# Patient Record
Sex: MALE
[De-identification: names, ages, dates, MRNs, and addresses within clinical notes are randomized per-mention and may not be internally consistent; named-entity substitution may affect disease eponyms.]

---

## 2022-09-09 PROBLEM — Z00.00 ENCOUNTER FOR PREVENTIVE HEALTH EXAMINATION: Status: ACTIVE | Noted: 2022-09-09

## 2022-10-17 ENCOUNTER — APPOINTMENT (OUTPATIENT)
Dept: CARDIOLOGY | Facility: CLINIC | Age: 42
End: 2022-10-17

## 2022-10-17 ENCOUNTER — NON-APPOINTMENT (OUTPATIENT)
Age: 42
End: 2022-10-17

## 2022-10-17 VITALS
HEART RATE: 82 BPM | SYSTOLIC BLOOD PRESSURE: 154 MMHG | OXYGEN SATURATION: 100 % | DIASTOLIC BLOOD PRESSURE: 92 MMHG | WEIGHT: 170.25 LBS | BODY MASS INDEX: 27.36 KG/M2 | HEIGHT: 66 IN

## 2022-10-17 DIAGNOSIS — R03.0 ELEVATED BLOOD-PRESSURE READING, W/OUT DIAGNOSIS OF HYPERTENSION: ICD-10-CM

## 2022-10-17 DIAGNOSIS — Z78.9 OTHER SPECIFIED HEALTH STATUS: ICD-10-CM

## 2022-10-17 DIAGNOSIS — Z82.49 FAMILY HISTORY OF ISCHEMIC HEART DISEASE AND OTHER DISEASES OF THE CIRCULATORY SYSTEM: ICD-10-CM

## 2022-10-17 DIAGNOSIS — R06.02 SHORTNESS OF BREATH: ICD-10-CM

## 2022-10-17 PROCEDURE — 99204 OFFICE O/P NEW MOD 45 MIN: CPT | Mod: 25

## 2022-10-17 PROCEDURE — 93000 ELECTROCARDIOGRAM COMPLETE: CPT

## 2022-10-17 NOTE — HISTORY OF PRESENT ILLNESS
[FreeTextEntry1] : 42 year old male without significant past medical history came for cardiac evaluation with complain that he had episode of shortness of breath , burping 1 month ago , continued for 4 days , as per patient who is under stressed , was drinking coffee , started feeling some discomfort in upper abdomen , with mild shortness of breath at rest , resolves with burping , not related to exertion , \par \par but do get same feeling while walking also , patient stopped drinking coffee since then he is feeling fine , \par \par

## 2022-10-17 NOTE — DISCUSSION/SUMMARY
[FreeTextEntry1] : Patient with above hx \par \par shortness of breath feeling with dyspepsia  possibly related to GERD  and anxiety component , normal blood pressure , normal EKG , no exertional symptoms , doubt cardiac origin  advised the patient to decrease caffeine intake , take omeprazole  and observe , will obtain echocardiogram , exercise stress test , \par will obtain his prior blood work to assess lipid status ,

## 2022-11-09 ENCOUNTER — APPOINTMENT (OUTPATIENT)
Dept: CARDIOLOGY | Facility: CLINIC | Age: 42
End: 2022-11-09

## 2022-11-09 PROCEDURE — 93306 TTE W/DOPPLER COMPLETE: CPT

## 2022-11-09 PROCEDURE — 93015 CV STRESS TEST SUPVJ I&R: CPT

## 2022-12-05 ENCOUNTER — APPOINTMENT (OUTPATIENT)
Dept: CARDIOLOGY | Facility: CLINIC | Age: 42
End: 2022-12-05

## 2022-12-05 VITALS
HEIGHT: 66 IN | WEIGHT: 173 LBS | DIASTOLIC BLOOD PRESSURE: 90 MMHG | BODY MASS INDEX: 27.8 KG/M2 | OXYGEN SATURATION: 100 % | SYSTOLIC BLOOD PRESSURE: 150 MMHG | HEART RATE: 70 BPM

## 2022-12-05 PROCEDURE — 99214 OFFICE O/P EST MOD 30 MIN: CPT

## 2022-12-05 RX ORDER — AMLODIPINE BESYLATE 2.5 MG/1
2.5 TABLET ORAL DAILY
Qty: 90 | Refills: 1 | Status: ACTIVE | COMMUNITY
Start: 2022-12-05 | End: 1900-01-01

## 2022-12-05 NOTE — CARDIOLOGY SUMMARY
[de-identified] : 10/17/22 normal sinus rhythm  [de-identified] : 11/9/22  7 METS  6 minutes 31 seconds  negative for ischemia  [de-identified] :  11/9/22  Mild LVH Normal EF 60-65%

## 2022-12-05 NOTE — HISTORY OF PRESENT ILLNESS
[FreeTextEntry1] : 42 year old male without significant past medical history came for follow up  after testings   , patient had echo showed mild LVH with normal EF , normal exercise stress test  normal pressure response ,   no recurrence of symptoms ,  his blood pressure is elevated , not very compliant to low salt diet \par \par His blood work showed Low HDl        Hb A1c 5.8  glucose 105 \par \par \par but do get same feeling while walking also , patient stopped drinking coffee since then he is feeling fine , \par \par

## 2022-12-05 NOTE — DISCUSSION/SUMMARY
[FreeTextEntry1] : Patient with above hx \par \par shortness of breath feeling with dyspepsia  possibly related to GERD  and anxiety component without recurrence , normal EKG , no exertional symptoms ,  had normal exercise stress test , normal EF \par \par \par Mild LVH on echo with elevated blood pressure readings  likely hypertensive heart disease will encourage patient to follow low salt diet , will give norvasc 2.5 mg po daily follow up after 6 weeks \par \par Dyslipidemia : Low HDL 38 ,      no family hx of CAD , encourage diet restriction , exercise , repeat blood work \par \par

## 2023-01-23 ENCOUNTER — APPOINTMENT (OUTPATIENT)
Dept: CARDIOLOGY | Facility: CLINIC | Age: 43
End: 2023-01-23
Payer: MEDICAID

## 2023-01-23 VITALS
HEIGHT: 66 IN | BODY MASS INDEX: 27.48 KG/M2 | SYSTOLIC BLOOD PRESSURE: 128 MMHG | HEART RATE: 68 BPM | WEIGHT: 171 LBS | DIASTOLIC BLOOD PRESSURE: 90 MMHG | OXYGEN SATURATION: 100 %

## 2023-01-23 DIAGNOSIS — E78.5 HYPERLIPIDEMIA, UNSPECIFIED: ICD-10-CM

## 2023-01-23 DIAGNOSIS — I11.9 HYPERTENSIVE HEART DISEASE W/OUT HEART FAILURE: ICD-10-CM

## 2023-01-23 DIAGNOSIS — I10 ESSENTIAL (PRIMARY) HYPERTENSION: ICD-10-CM

## 2023-01-23 DIAGNOSIS — K21.9 GASTRO-ESOPHAGEAL REFLUX DISEASE W/OUT ESOPHAGITIS: ICD-10-CM

## 2023-01-23 PROCEDURE — 99214 OFFICE O/P EST MOD 30 MIN: CPT

## 2023-01-23 NOTE — HISTORY OF PRESENT ILLNESS
[FreeTextEntry1] : 42 year old male without significant past medical history came for follow up  of hypertension , started on medication  says he stopped taking medication as his home bp is normal range \par \par \par   \par  patient had echo showed mild LVH with normal EF , normal exercise stress test  normal pressure response ,   no recurrence of symptoms ,  his blood pressure is improved  stopped medication , now  compliant to low salt diet \par \par His blood work showed Low HDl        Hb A1c 5.8  glucose 105 \par \par \par but do get same feeling while walking also , patient stopped drinking coffee since then he is feeling fine , \par \par no recent heart burn symptoms \par \par

## 2023-01-23 NOTE — DISCUSSION/SUMMARY
[FreeTextEntry1] : Patient with above hx \par \par shortness of breath feeling with dyspepsia  possibly related to GERD  and anxiety component without recurrence , normal EKG , no exertional symptoms ,  had normal exercise stress test , normal EF \par \par \par Borderline LVH on echo with elevated blood pressure readings  likely hypertensive heart disease will encourage patient to follow low salt diet ,  now controlled pressure off of medication , encourage patient monitor home BP \par \par Dyslipidemia : Low HDL 38 ,      no family hx of CAD , encourage diet restriction , exercise , repeat blood work \par \par

## 2023-01-23 NOTE — CARDIOLOGY SUMMARY
[de-identified] : 10/17/22 normal sinus rhythm  [de-identified] : 11/9/22  7 METS  6 minutes 31 seconds  negative for ischemia  [de-identified] :  11/9/22  Mild LVH Normal EF 60-65%

## 2023-06-16 NOTE — CARDIOLOGY SUMMARY
[de-identified] : 10/17/22 normal sinus rhythm  [de-identified] : 11/9/22  7 METS  6 minutes 31 seconds  negative for ischemia  [de-identified] :  11/9/22  Mild LVH Normal EF 60-65%

## 2023-06-16 NOTE — HISTORY OF PRESENT ILLNESS
[FreeTextEntry1] : 42 year old male without significant past medical history came for follow up  of hypertension , started on medication  says he stopped taking medication as his home bp is normal range \par \par patient had echo showed mild LVH with normal EF , normal exercise stress test  normal pressure response ,   no recurrence of symptoms ,  his blood pressure is improved  stopped medication , now  compliant to low salt diet \par \par His blood work showed Low HDl        Hb A1c 5.8  glucose 105 \par \par but do get same feeling while walking also , patient stopped drinking coffee since then he is feeling fine , \par \par no recent heart burn symptoms \par \par

## 2023-06-19 ENCOUNTER — APPOINTMENT (OUTPATIENT)
Dept: CARDIOLOGY | Facility: CLINIC | Age: 43
End: 2023-06-19

## 2023-08-28 ENCOUNTER — APPOINTMENT (OUTPATIENT)
Dept: CARDIOLOGY | Facility: CLINIC | Age: 43
End: 2023-08-28